# Patient Record
Sex: FEMALE | Race: WHITE | NOT HISPANIC OR LATINO | Employment: OTHER | URBAN - METROPOLITAN AREA
[De-identification: names, ages, dates, MRNs, and addresses within clinical notes are randomized per-mention and may not be internally consistent; named-entity substitution may affect disease eponyms.]

---

## 2023-03-21 RX ORDER — SIMVASTATIN 40 MG
TABLET ORAL
Qty: 90 TABLET | Refills: 0 | Status: SHIPPED | OUTPATIENT
Start: 2023-03-21

## 2023-04-03 RX ORDER — CYANOCOBALAMIN 1000 UG/ML
INJECTION, SOLUTION INTRAMUSCULAR; SUBCUTANEOUS
Qty: 3 ML | Refills: 1 | Status: SHIPPED | OUTPATIENT
Start: 2023-04-03

## 2023-04-03 NOTE — TELEPHONE ENCOUNTER
Rx Refill Note  Requested Prescriptions     Pending Prescriptions Disp Refills   • cyanocobalamin 1000 MCG/ML injection [Pharmacy Med Name: CYANOCOBALAMIN 1000MCG/ML INJ, 1ML] 3 mL      Sig: INJECT 1ML AS DIRECTED EVERY MONTH      Last office visit with prescribing clinician: 09/20/2022    Next office visit with prescribing clinician: 4/21/2023   Last Labs Done: 09/20/2022 (Information found in [facility])    Cameron Matias CMA  04/03/23, 12:02 EDT

## 2023-04-21 ENCOUNTER — OFFICE VISIT (OUTPATIENT)
Dept: FAMILY MEDICINE CLINIC | Facility: CLINIC | Age: 83
End: 2023-04-21
Payer: MEDICARE

## 2023-04-21 VITALS
BODY MASS INDEX: 26.57 KG/M2 | TEMPERATURE: 97.8 F | RESPIRATION RATE: 16 BRPM | WEIGHT: 159.5 LBS | HEART RATE: 71 BPM | HEIGHT: 65 IN | SYSTOLIC BLOOD PRESSURE: 131 MMHG | OXYGEN SATURATION: 98 % | DIASTOLIC BLOOD PRESSURE: 79 MMHG

## 2023-04-21 DIAGNOSIS — I48.0 PAROXYSMAL ATRIAL FIBRILLATION: ICD-10-CM

## 2023-04-21 DIAGNOSIS — M51.36 LUMBAR DEGENERATIVE DISC DISEASE: ICD-10-CM

## 2023-04-21 DIAGNOSIS — Z78.0 POSTMENOPAUSAL: ICD-10-CM

## 2023-04-21 DIAGNOSIS — E78.2 MIXED HYPERLIPIDEMIA: Primary | ICD-10-CM

## 2023-04-21 DIAGNOSIS — D59.8 OTHER ACQUIRED HEMOLYTIC ANEMIAS: ICD-10-CM

## 2023-04-21 PROBLEM — M51.369 LUMBAR DEGENERATIVE DISC DISEASE: Status: ACTIVE | Noted: 2023-04-21

## 2023-04-21 NOTE — PATIENT INSTRUCTIONS
Continue your current medications.   You had lab tests today. You should receive a call or my chart message with your test results. If you have not received your results in the next 7-10 days, please contact the office.    Agree with PT and follow up with pain management as scheduled.  Do follow up CBC and retic count through your hematologist as recommended.

## 2023-04-21 NOTE — PROGRESS NOTES
"Chief Complaint  Hyperlipidemia, Allergies, and Back Pain    Subjective    History of Present Illness {CC  Problem List  Visit  Diagnosis   Encounters  Notes  Medications  Labs  Result Review Imaging  Media :23}     Eryn Alva presents to [facility] for Hyperlipidemia, Allergies, and Back Pain.  History of Present Illness   She presents for follow-up of hyperlipidemia.  She is currently on simvastatin 40 mg daily.  She reports good compliance and tolerability of the medication.  Her last cholesterol was 209 with LDL of 106.  She is fasting for repeat today.    She is also here for follow-up of atrial fibrillation.  She is stable on metoprolol 50 mg twice daily.  She is also on Xarelto for stroke prevention.  She denies any recent palpitations.  She continues to see the cardiologist routinely.    She is also here for follow-up of autoimmune hemolytic anemia.  She is not currently on any medications.  She recently had follow-up with her hematologist and she was stable off medication.  Her CBC and reticulocyte site count will be monitored every 3 months through the hematologist and she will continue to see them on a yearly basis.    She is also here for follow-up of low back pain.  She is currently seeing pain management for her degenerative disc disease.  They have discussed the possibility of a stimulator to help with pain but she has not decided if she wants to try this or not.    Objective     Vital Signs:   /79   Pulse 71   Temp 97.8 °F (36.6 °C) (Oral)   Resp 16   Ht 165.1 cm (65\")   Wt 72.3 kg (159 lb 8 oz)   SpO2 98%   BMI 26.54 kg/m²   Body mass index is 26.54 kg/m².     Physical Exam  Constitutional:       General: She is not in acute distress.  Cardiovascular:      Rate and Rhythm: Normal rate and regular rhythm.      Heart sounds: No murmur heard.  Pulmonary:      Effort: No respiratory distress.      Breath sounds: Normal breath sounds. "   Neurological:      General: No focal deficit present.      Mental Status: She is alert.   Psychiatric:         Mood and Affect: Mood normal.         Behavior: Behavior normal.          Result Review  Data Reviewed:{ Labs  Result Review  Imaging  Med Tab  Media :23}                Assessment and Plan {CC Problem List  Visit Diagnosis  ROS  Review (Popup)  Health Maintenance  Quality  BestPractice  Medications  SmartSets  SnapShot Encounters  Media :23}   Diagnoses and all orders for this visit:    1. Mixed hyperlipidemia (Primary)  -     Lipid Panel    2. Paroxysmal atrial fibrillation    3. Other acquired hemolytic anemias    4. Lumbar degenerative disc disease    5. Postmenopausal  -     DEXA Bone Density Axial; Future        Patient Instructions   Continue your current medications.   You had lab tests today. You should receive a call or my chart message with your test results. If you have not received your results in the next 7-10 days, please contact the office.    Agree with PT and follow up with pain management as scheduled.  Do follow up CBC and retic count through your hematologist as recommended.       Patient was given instructions and counseling regarding her condition or for health maintenance advice on the AVS.       Return in about 6 months (around 10/21/2023) for Annual, Medicare Wellness.    Anabelle Horne MD

## 2023-04-22 LAB
CHOLEST SERPL-MCNC: 194 MG/DL (ref 100–199)
HDLC SERPL-MCNC: 78 MG/DL
LDLC SERPL CALC-MCNC: 70 MG/DL (ref 0–99)
TRIGL SERPL-MCNC: 293 MG/DL (ref 0–149)
VLDLC SERPL CALC-MCNC: 46 MG/DL (ref 5–40)

## 2023-05-26 ENCOUNTER — HOSPITAL ENCOUNTER (OUTPATIENT)
Dept: BONE DENSITY | Facility: HOSPITAL | Age: 83
Discharge: HOME | End: 2023-05-26
Payer: MEDICARE

## 2023-05-26 DIAGNOSIS — Z78.0 POSTMENOPAUSAL: ICD-10-CM

## 2023-05-26 PROCEDURE — 77080 DXA BONE DENSITY AXIAL: CPT

## 2023-09-20 RX ORDER — CYANOCOBALAMIN 1000 UG/ML
INJECTION, SOLUTION INTRAMUSCULAR; SUBCUTANEOUS
Qty: 3 ML | Refills: 1 | Status: SHIPPED | OUTPATIENT
Start: 2023-09-20

## 2023-09-20 NOTE — TELEPHONE ENCOUNTER
Rx Refill Note  Requested Prescriptions     Pending Prescriptions Disp Refills    cyanocobalamin 1000 MCG/ML injection [Pharmacy Med Name: CYANOCOBALAMIN 1000MCG/ML INJ 1ML] 3 mL 1     Sig: INJECT 1 ML AS DIRECTED EVERY MONTH      Last office visit with prescribing clinician: 4/21/2023   Last telemedicine visit with prescribing clinician: Visit date not found   Next office visit with prescribing clinician: 10/25/2023       Faye Rowley  09/20/23, 09:08 EDT

## 2023-10-16 ENCOUNTER — TELEPHONE (OUTPATIENT)
Dept: FAMILY MEDICINE CLINIC | Facility: CLINIC | Age: 83
End: 2023-10-16
Payer: COMMERCIAL

## 2023-10-16 DIAGNOSIS — Z12.31 SCREENING MAMMOGRAM, ENCOUNTER FOR: Primary | ICD-10-CM

## 2023-10-24 ENCOUNTER — TELEPHONE (OUTPATIENT)
Dept: FAMILY MEDICINE CLINIC | Facility: CLINIC | Age: 83
End: 2023-10-24
Payer: COMMERCIAL

## 2023-10-24 NOTE — TELEPHONE ENCOUNTER
Caller: FRANCIS MARIE    Relationship: Emergency Contact    Best call back number: [phone]     What orders are you requesting (i.e. lab or imaging): LABS    In what timeframe would the patient need to come in: PRIOR TO [facility]     Where will you receive your lab/imaging services: OFFICE    Additional notes: PLEASE CALL PATIENT TO SCHEDULE WHEN ORDERS ARE PLACED.         Wound Care Update:    Your provider has made some changes to your wound care plan as follows: Gentamycin ointment to the wound bases, and moisturizer to the dry skin of the remainder of the lower legs followed by Therabond and an absorbant layer (gauze).  After the treatments are applied, your legs will be wrapped in CoFlex 2-layer wraps.      You will continue to have your dressing changes performed by nurses from [facility] 2 times/week.      Protein    Dietary protein intake is an important component to promote wound healing.  Increase daily protein intake by eating more protein-rich foods such as lean meats, beans/legumes, peanut butter, and milk products including milk, cheese, and yogurt.  If you are unable to get enough protein intake through your diet, incorporate a protein supplement 1-2 times daily, such as Boost, Glucerna, or Ensure.    Elevate    Elevating your legs, above hip level or as high as you can tolerate it, as often as possible while you are awake.  This helps to promote circulation and decrease swelling in the lower legs.

## 2023-10-25 NOTE — TELEPHONE ENCOUNTER
I'm not seeing those labs in chart yet. I suspect she will still need some labs, cholesterol and possibly thyroid checked. If she wants to wait and do at time of visit we can.

## 2023-10-25 NOTE — TELEPHONE ENCOUNTER
Spoke with pt, she stated her cardoza  valentina blood yesterday and her biliruben was high and bp was low. Has now started infusions for this. Was unsure if you needed more labs or if you could just use those.

## 2023-10-26 NOTE — TELEPHONE ENCOUNTER
Pt aware, she said she's a hard stick and we usually have to send her out whenever she comes here. Can we send an order to [facility] for the Lipid and TSH so when they draw her labs next they can do it all at once?

## 2023-10-26 NOTE — TELEPHONE ENCOUNTER
Pt said she got the labs done at [facility]. L/m with the nurse line to have them fax over lab results. Their voicemail said medical record requests can take up to 48 hours.

## 2023-10-30 DIAGNOSIS — I48.0 PAROXYSMAL ATRIAL FIBRILLATION: ICD-10-CM

## 2023-10-30 DIAGNOSIS — E78.2 MIXED HYPERLIPIDEMIA: Primary | ICD-10-CM

## 2023-10-30 NOTE — TELEPHONE ENCOUNTER
I'm not able to send to [facility] but I did print out the order that we might be able to fax to [facility], mail to her or have her /

## 2023-12-18 RX ORDER — SIMVASTATIN 40 MG
TABLET ORAL
Qty: 90 TABLET | Refills: 1 | Status: SHIPPED | OUTPATIENT
Start: 2023-12-18

## 2023-12-18 NOTE — TELEPHONE ENCOUNTER
Rx Refill Note  Requested Prescriptions     Pending Prescriptions Disp Refills    simvastatin (ZOCOR) 40 MG tablet [Pharmacy Med Name: SIMVASTATIN  TAB 40MG] 90 tablet 1     Sig: TAKE 1 TABLET DAILY      Last office visit with prescribing clinician: 4/21/2023   Next office visit with prescribing clinician: 2/14/2024     Grace Allan MA  12/18/23, 15:30 EST

## 2024-02-14 ENCOUNTER — OFFICE VISIT (OUTPATIENT)
Dept: FAMILY MEDICINE CLINIC | Facility: CLINIC | Age: 84
End: 2024-02-14
Payer: MEDICARE

## 2024-02-14 VITALS
OXYGEN SATURATION: 99 % | DIASTOLIC BLOOD PRESSURE: 77 MMHG | HEIGHT: 65 IN | BODY MASS INDEX: 26.16 KG/M2 | SYSTOLIC BLOOD PRESSURE: 129 MMHG | WEIGHT: 157 LBS | HEART RATE: 75 BPM

## 2024-02-14 DIAGNOSIS — E53.8 B12 DEFICIENCY: ICD-10-CM

## 2024-02-14 DIAGNOSIS — E78.2 MIXED HYPERLIPIDEMIA: ICD-10-CM

## 2024-02-14 DIAGNOSIS — Z00.00 ENCOUNTER FOR SUBSEQUENT ANNUAL WELLNESS VISIT (AWV) IN MEDICARE PATIENT: Primary | ICD-10-CM

## 2024-02-14 DIAGNOSIS — D59.8 OTHER ACQUIRED HEMOLYTIC ANEMIAS: ICD-10-CM

## 2024-02-14 DIAGNOSIS — I48.0 PAROXYSMAL ATRIAL FIBRILLATION: ICD-10-CM

## 2024-02-14 DIAGNOSIS — M51.36 LUMBAR DEGENERATIVE DISC DISEASE: ICD-10-CM

## 2024-02-14 RX ORDER — PREDNISONE 20 MG/1
20 TABLET ORAL DAILY
COMMUNITY

## 2024-02-14 RX ORDER — DIPHENOXYLATE HYDROCHLORIDE AND ATROPINE SULFATE 2.5; .025 MG/1; MG/1
TABLET ORAL DAILY
COMMUNITY

## 2024-02-14 RX ORDER — LEVOCETIRIZINE DIHYDROCHLORIDE 5 MG/1
5 TABLET, FILM COATED ORAL DAILY
COMMUNITY

## 2024-02-15 LAB
CHOLEST SERPL-MCNC: 221 MG/DL (ref 0–200)
HDLC SERPL-MCNC: 90 MG/DL (ref 40–60)
LDLC SERPL CALC-MCNC: 86 MG/DL (ref 0–100)
TRIGL SERPL-MCNC: 280 MG/DL (ref 0–150)
VIT B12 SERPL-MCNC: 811 PG/ML (ref 211–946)
VLDLC SERPL CALC-MCNC: 45 MG/DL (ref 5–40)

## 2024-04-04 RX ORDER — CYANOCOBALAMIN 1000 UG/ML
INJECTION, SOLUTION INTRAMUSCULAR; SUBCUTANEOUS
Qty: 3 ML | Refills: 1 | Status: SHIPPED | OUTPATIENT
Start: 2024-04-04

## 2024-04-04 NOTE — TELEPHONE ENCOUNTER
Rx Refill Note  Requested Prescriptions     Pending Prescriptions Disp Refills    cyanocobalamin 1000 MCG/ML injection [Pharmacy Med Name: CYANOCOBALAMIN 1000MCG/MLINJ 1ML] 3 mL 1     Sig: INJECT 1 ML AS DIRECTED ONCE A MONTH      Last office visit with prescribing clinician: 2/14/2024   Last telemedicine visit with prescribing clinician: Visit date not found   Next office visit with prescribing clinician: Visit date not found     Grace Allan MA  04/04/24, 13:33 EDT

## 2024-06-24 RX ORDER — SIMVASTATIN 40 MG
TABLET ORAL
Qty: 90 TABLET | Refills: 0 | Status: SHIPPED | OUTPATIENT
Start: 2024-06-24

## 2024-06-24 NOTE — TELEPHONE ENCOUNTER
Rx Refill Note  Requested Prescriptions     Pending Prescriptions Disp Refills    simvastatin (ZOCOR) 40 MG tablet [Pharmacy Med Name: SIMVASTATIN  TAB 40MG] 90 tablet 1     Sig: TAKE 1 TABLET DAILY      Last office visit with prescribing clinician: Visit date not found   Last telemedicine visit with prescribing clinician: Visit date not found   Next office visit with prescribing clinician: Visit date not found       Robel Samayoa MA  06/24/24, 08:12 EDT

## 2024-09-19 RX ORDER — SIMVASTATIN 40 MG
TABLET ORAL
Qty: 90 TABLET | Refills: 0 | Status: SHIPPED | OUTPATIENT
Start: 2024-09-19

## 2024-09-25 RX ORDER — CYANOCOBALAMIN 1000 UG/ML
INJECTION, SOLUTION INTRAMUSCULAR; SUBCUTANEOUS
Qty: 3 ML | Refills: 1 | Status: SHIPPED | OUTPATIENT
Start: 2024-09-25

## 2024-10-02 ENCOUNTER — OFFICE VISIT (OUTPATIENT)
Dept: FAMILY MEDICINE CLINIC | Facility: CLINIC | Age: 84
End: 2024-10-02
Payer: MEDICARE

## 2024-10-02 VITALS
HEART RATE: 77 BPM | WEIGHT: 157.1 LBS | SYSTOLIC BLOOD PRESSURE: 134 MMHG | OXYGEN SATURATION: 97 % | HEIGHT: 65 IN | DIASTOLIC BLOOD PRESSURE: 70 MMHG | BODY MASS INDEX: 26.17 KG/M2

## 2024-10-02 DIAGNOSIS — M51.360 DEGENERATION OF INTERVERTEBRAL DISC OF LUMBAR REGION WITH DISCOGENIC BACK PAIN: ICD-10-CM

## 2024-10-02 DIAGNOSIS — I48.0 PAROXYSMAL ATRIAL FIBRILLATION: ICD-10-CM

## 2024-10-02 DIAGNOSIS — E78.2 MIXED HYPERLIPIDEMIA: Primary | ICD-10-CM

## 2024-10-02 DIAGNOSIS — Z79.01 CURRENT USE OF LONG TERM ANTICOAGULATION: ICD-10-CM

## 2024-10-02 DIAGNOSIS — D59.8 OTHER ACQUIRED HEMOLYTIC ANEMIAS: ICD-10-CM

## 2024-10-02 PROCEDURE — G2211 COMPLEX E/M VISIT ADD ON: HCPCS | Performed by: FAMILY MEDICINE

## 2024-10-02 PROCEDURE — 1159F MED LIST DOCD IN RCRD: CPT | Performed by: FAMILY MEDICINE

## 2024-10-02 PROCEDURE — 99214 OFFICE O/P EST MOD 30 MIN: CPT | Performed by: FAMILY MEDICINE

## 2024-10-02 PROCEDURE — 1160F RVW MEDS BY RX/DR IN RCRD: CPT | Performed by: FAMILY MEDICINE

## 2024-10-02 NOTE — PATIENT INSTRUCTIONS
Continue your current medications, healthy diet and exercise as tolerated.  You had lab tests today. You should receive a call or my chart message with your test results. If you have not received your results in the next 7-10 days, please contact the office.    Follow up with hematologist as scheduled.  If back pain worsens and you would like to pursue further treatment options, let us know.

## 2024-10-02 NOTE — PROGRESS NOTES
"Chief Complaint  Hyperlipidemia    Subjective    History of Present Illness {  Problem List  Visit  Diagnosis   Encounters  Notes  Medications  Labs  Result Review Imaging  Media :23}     Eryn Alva presents to [facility] for Hyperlipidemia.  Hyperlipidemia       She presents for follow up of hyperlipidemia. She is currently on simvastatin 40mg daily. She reports good compliance and tolerability of medication. Her last cholesterol was 221 with LDL of 86, HDL 90 and triglycerides of 280. She is due recheck today.    She has a history of autoimmune hemolytic anemia. She sees hematologist, Dr. Silva is currently on prednisone. She has reduced gradually to 2mg every other day. She is also on rituxan infusion about every 4 months. She has labs routinely but hasn't had in over 2 months.     She also has a history of paroxysmal atrial fibrillation and is on metoprolol and xarelto. She will have intermittent episodes of palpitations for 1-2 minutes. She sees the cardiologist yearly.    She has a history of ulcerative colitis and has had previous colectomy. She had not had recent flare.    She complains of ongoing low back pain. She has a history of lumbar degenerative disc disease. She had surgery years ago and has recently done physical therapy. She has also seen pain management. They discussed procedure but she declined as her current pain level is manageable.        Objective     Vital Signs:   /70   Pulse 77   Ht 165.1 cm (65\")   Wt 71.3 kg (157 lb 1.6 oz)   SpO2 97%   BMI 26.14 kg/m²   Body mass index is 26.14 kg/m².     Physical Exam  Constitutional:       General: She is not in acute distress.  Cardiovascular:      Rate and Rhythm: Normal rate and regular rhythm.      Heart sounds: No murmur heard.  Pulmonary:      Effort: No respiratory distress.      Breath sounds: Normal breath sounds.   Neurological:      General: No focal deficit present.      " Mental Status: She is alert.   Psychiatric:         Behavior: Behavior normal.          Result Review  Data Reviewed:{ Labs  Result Review  Imaging  Med Tab  Media :23}                Assessment and Plan {CC Problem List  Visit Diagnosis  ROS  Review (Popup)  Health Maintenance  Quality  BestPractice  Medications  SmartSets  SnapShot Encounters  Media :23}   Diagnoses and all orders for this visit:    1. Mixed hyperlipidemia (Primary)  -     Comprehensive Metabolic Panel  -     Lipid Panel    2. Paroxysmal atrial fibrillation    3. Other acquired hemolytic anemias  -     Reticulocytes  -     CBC & Differential  -     Comprehensive Metabolic Panel  -     Lactate Dehydrogenase    4. Degeneration of intervertebral disc of lumbar region with discogenic back pain    5. Current use of long term anticoagulation  -     CBC & Differential        Patient Instructions   Continue your current medications, healthy diet and exercise as tolerated.  You had lab tests today. You should receive a call or my chart message with your test results. If you have not received your results in the next 7-10 days, please contact the office.    Follow up with hematologist as scheduled.  If back pain worsens and you would like to pursue further treatment options, let us know.      Patient was given instructions and counseling regarding her condition or for health maintenance advice on the AVS.       Return in about 6 months (around 4/2/2025) for Medicare Wellness.    Anabelle Horne MD

## 2024-10-07 LAB
ALBUMIN SERPL-MCNC: 4.5 G/DL (ref 3.5–5.2)
ALBUMIN/GLOB SERPL: 2.6 G/DL
ALP SERPL-CCNC: 75 U/L (ref 39–117)
ALT SERPL-CCNC: 16 U/L (ref 1–33)
AST SERPL-CCNC: 32 U/L (ref 1–32)
BASOPHILS # BLD MANUAL: 0.16 10*3/MM3 (ref 0–0.2)
BASOPHILS NFR BLD MANUAL: 4 % (ref 0–1.5)
BILIRUB SERPL-MCNC: 4.7 MG/DL (ref 0–1.2)
BUN SERPL-MCNC: 21 MG/DL (ref 8–23)
BUN/CREAT SERPL: 21 (ref 7–25)
CALCIUM SERPL-MCNC: 9.8 MG/DL (ref 8.6–10.5)
CHLORIDE SERPL-SCNC: 105 MMOL/L (ref 98–107)
CHOLEST SERPL-MCNC: 163 MG/DL (ref 0–200)
CO2 SERPL-SCNC: 23.3 MMOL/L (ref 22–29)
CREAT SERPL-MCNC: 1 MG/DL (ref 0.57–1)
DIFFERENTIAL COMMENT: ABNORMAL
EGFRCR SERPLBLD CKD-EPI 2021: 55.7 ML/MIN/1.73
EOSINOPHIL # BLD MANUAL: 0 10*3/MM3 (ref 0–0.4)
EOSINOPHIL NFR BLD MANUAL: 0 % (ref 0.3–6.2)
ERYTHROCYTE [DISTWIDTH] IN BLOOD BY AUTOMATED COUNT: 11.6 % (ref 12.3–15.4)
GLOBULIN SER CALC-MCNC: 1.7 GM/DL
GLUCOSE SERPL-MCNC: 98 MG/DL (ref 65–99)
HCT VFR BLD AUTO: 35.3 % (ref 34–46.6)
HDLC SERPL-MCNC: 57 MG/DL (ref 40–60)
HGB BLD-MCNC: 11.9 G/DL (ref 12–15.9)
LDH SERPL L TO P-CCNC: 387 U/L (ref 135–214)
LDLC SERPL CALC-MCNC: 64 MG/DL (ref 0–100)
LYMPHOCYTES # BLD MANUAL: 0.85 10*3/MM3 (ref 0.7–3.1)
LYMPHOCYTES NFR BLD MANUAL: 21 % (ref 19.6–45.3)
MCH RBC QN AUTO: 34.4 PG (ref 26.6–33)
MCHC RBC AUTO-ENTMCNC: 33.7 G/DL (ref 31.5–35.7)
MCV RBC AUTO: 102 FL (ref 79–97)
MONOCYTES # BLD MANUAL: 0.73 10*3/MM3 (ref 0.1–0.9)
MONOCYTES NFR BLD MANUAL: 18 % (ref 5–12)
NEUTROPHILS # BLD MANUAL: 2.31 10*3/MM3 (ref 1.7–7)
NEUTROPHILS NFR BLD MANUAL: 57 % (ref 42.7–76)
NRBC BLD AUTO-RTO: 0 /100 WBC (ref 0–0.2)
PLATELET # BLD AUTO: 212 10*3/MM3 (ref 140–450)
PLATELET BLD QL SMEAR: ABNORMAL
POTASSIUM SERPL-SCNC: 4.4 MMOL/L (ref 3.5–5.2)
PROT SERPL-MCNC: 6.2 G/DL (ref 6–8.5)
RBC # BLD AUTO: 3.46 10*6/MM3 (ref 3.77–5.28)
RBC MORPH BLD: ABNORMAL
RETICS/RBC NFR AUTO: 3.81 % (ref 0.7–1.9)
SODIUM SERPL-SCNC: 143 MMOL/L (ref 136–145)
TRIGL SERPL-MCNC: 268 MG/DL (ref 0–150)
VLDLC SERPL CALC-MCNC: 42 MG/DL (ref 5–40)
WBC # BLD AUTO: 4.06 10*3/MM3 (ref 3.4–10.8)

## 2024-11-08 ENCOUNTER — TELEPHONE (OUTPATIENT)
Dept: FAMILY MEDICINE CLINIC | Facility: CLINIC | Age: 84
End: 2024-11-08
Payer: MEDICARE

## 2024-11-08 NOTE — TELEPHONE ENCOUNTER
[facility] SCHEDULING SENT A FAX OVER FOR PT TO HAVE A MAMMOGRAM HEIDY SCREENING BILATERAL SCHEDULED AT [facility] TUESDAY 11/12/24 AND NEEDS AN ORDER FAXED [phone].

## 2024-11-11 ENCOUNTER — OFFICE VISIT (OUTPATIENT)
Dept: FAMILY MEDICINE CLINIC | Facility: CLINIC | Age: 84
End: 2024-11-11
Payer: MEDICARE

## 2024-11-11 ENCOUNTER — TELEPHONE (OUTPATIENT)
Dept: FAMILY MEDICINE CLINIC | Facility: CLINIC | Age: 84
End: 2024-11-11

## 2024-11-11 VITALS
WEIGHT: 154.6 LBS | BODY MASS INDEX: 25.76 KG/M2 | SYSTOLIC BLOOD PRESSURE: 132 MMHG | DIASTOLIC BLOOD PRESSURE: 64 MMHG | OXYGEN SATURATION: 97 % | HEIGHT: 65 IN | HEART RATE: 68 BPM

## 2024-11-11 DIAGNOSIS — Z12.31 SCREENING MAMMOGRAM, ENCOUNTER FOR: Primary | ICD-10-CM

## 2024-11-11 DIAGNOSIS — D59.8 OTHER ACQUIRED HEMOLYTIC ANEMIAS: ICD-10-CM

## 2024-11-11 DIAGNOSIS — I48.0 PAROXYSMAL ATRIAL FIBRILLATION: ICD-10-CM

## 2024-11-11 DIAGNOSIS — R10.30 LOWER ABDOMINAL PAIN: Primary | ICD-10-CM

## 2024-11-11 LAB
BILIRUB BLD-MCNC: NEGATIVE MG/DL
CLARITY, POC: ABNORMAL
COLOR UR: ABNORMAL
GLUCOSE UR STRIP-MCNC: NEGATIVE MG/DL
KETONES UR QL: NEGATIVE
LEUKOCYTE EST, POC: NEGATIVE
NITRITE UR-MCNC: NEGATIVE MG/ML
PH UR: 5.5 [PH] (ref 5–8)
PROT UR STRIP-MCNC: ABNORMAL MG/DL
RBC # UR STRIP: NEGATIVE /UL
SP GR UR: 1.03 (ref 1–1.03)
UROBILINOGEN UR QL: ABNORMAL

## 2024-11-11 PROCEDURE — 81002 URINALYSIS NONAUTO W/O SCOPE: CPT | Performed by: FAMILY MEDICINE

## 2024-11-11 PROCEDURE — 99213 OFFICE O/P EST LOW 20 MIN: CPT | Performed by: FAMILY MEDICINE

## 2024-11-11 RX ORDER — NITROFURANTOIN 25; 75 MG/1; MG/1
100 CAPSULE ORAL 2 TIMES DAILY
Qty: 14 CAPSULE | Refills: 0 | Status: SHIPPED | OUTPATIENT
Start: 2024-11-11 | End: 2024-11-18

## 2024-11-11 NOTE — PATIENT INSTRUCTIONS
Increase water and take antibiotic as directed.  If increasing pain or new symptoms despite treatment let us know or if symptoms are severe, go to the ER.

## 2024-11-11 NOTE — PROGRESS NOTES
"Chief Complaint  Urinary Tract Infection    Subjective    History of Present Illness {CC  Problem List  Visit  Diagnosis   Encounters  Notes  Medications  Labs  Result Review Imaging  Media :23}     Eryn Alva presents to [facility] for Urinary Tract Infection.  Urinary Tract Infection          She presents with concern of possible UTI. She was seen in the ER 1.5 weeks ago with flare of atrial fibrillation and was found to have a UTI. She was treated with keflex and improved but the past couple of days she has been having increased nausea, mild abdominal tenderness and frequency. She denies any fever or dysuria.     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 She sees Dr. Mercado) and is being started on a new medication (cellcept) but has not yet started this.    Objective     Vital Signs:   /64   Pulse 68   Ht 165.1 cm (65\")   Wt 70.1 kg (154 lb 9.6 oz)   SpO2 97%   BMI 25.73 kg/m²   Body mass index is 25.73 kg/m².     Physical Exam  Constitutional:       General: She is not in acute distress.  Pulmonary:      Effort: No respiratory distress.   Abdominal:      General: There is no distension.      Palpations: Abdomen is soft.      Tenderness: There is abdominal tenderness (mild left).   Skin:     Coloration: Skin is jaundiced (mild).   Psychiatric:         Behavior: Behavior normal.          Result Review  Data Reviewed:{ Labs  Result Review  Imaging  Med Tab  Media :23}                Assessment and Plan {CC Problem List  Visit Diagnosis  ROS  Review (Popup)  Health Maintenance  Quality  BestPractice  Medications  SmartSets  SnapShot Encounters  Media :23}   Diagnoses and all orders for this visit:    1. Lower abdominal pain (Primary)  -     POC Urinalysis Dipstick  -     Urine Culture - Urine, Urine, Clean Catch    2. Other acquired hemolytic anemias    3. Paroxysmal atrial fibrillation    Other " orders  -     nitrofurantoin, macrocrystal-monohydrate, (Macrobid) 100 MG capsule; Take 1 capsule by mouth 2 (Two) Times a Day for 7 days.  Dispense: 14 capsule; Refill: 0        Patient Instructions   Increase water and take antibiotic as directed.  If increasing pain or new symptoms despite treatment let us know or if symptoms are severe, go to the ER.      Patient was given instructions and counseling regarding her condition or for health maintenance advice on the AVS.       No follow-ups on file.    Anabelle Horne MD

## 2024-11-13 LAB
BACTERIA UR CULT: NORMAL
BACTERIA UR CULT: NORMAL

## 2024-12-17 RX ORDER — SIMVASTATIN 40 MG
TABLET ORAL
Qty: 90 TABLET | Refills: 0 | Status: SHIPPED | OUTPATIENT
Start: 2024-12-17

## 2025-03-04 RX ORDER — CYANOCOBALAMIN 1000 UG/ML
INJECTION, SOLUTION INTRAMUSCULAR; SUBCUTANEOUS
Qty: 3 ML | Refills: 1 | Status: SHIPPED | OUTPATIENT
Start: 2025-03-04

## 2025-03-04 NOTE — TELEPHONE ENCOUNTER
Rx Refill Note  Requested Prescriptions     Pending Prescriptions Disp Refills    cyanocobalamin 1000 MCG/ML injection [Pharmacy Med Name: CYANOCOBALAMIN 1000MCG/ML INJ, 1ML] 3 mL 1     Sig: INJECT 1 ML AS DIRECTED ONCE A MONTH      Last office visit with prescribing clinician: 11/11/2024   Next office visit with prescribing clinician: 4/16/2025     Robel Samayoa MA  03/04/25, 11:28 EST

## 2025-03-14 RX ORDER — SIMVASTATIN 40 MG
40 TABLET ORAL DAILY
Qty: 90 TABLET | Refills: 0 | Status: SHIPPED | OUTPATIENT
Start: 2025-03-14

## 2025-04-16 ENCOUNTER — OFFICE VISIT (OUTPATIENT)
Dept: FAMILY MEDICINE CLINIC | Facility: CLINIC | Age: 85
End: 2025-04-16
Payer: MEDICARE

## 2025-04-16 VITALS
OXYGEN SATURATION: 100 % | HEART RATE: 67 BPM | HEIGHT: 65 IN | WEIGHT: 151 LBS | DIASTOLIC BLOOD PRESSURE: 73 MMHG | BODY MASS INDEX: 25.16 KG/M2 | SYSTOLIC BLOOD PRESSURE: 151 MMHG

## 2025-04-16 DIAGNOSIS — I48.0 PAROXYSMAL ATRIAL FIBRILLATION: ICD-10-CM

## 2025-04-16 DIAGNOSIS — Z79.01 CURRENT USE OF LONG TERM ANTICOAGULATION: ICD-10-CM

## 2025-04-16 DIAGNOSIS — Z00.00 ENCOUNTER FOR SUBSEQUENT ANNUAL WELLNESS VISIT (AWV) IN MEDICARE PATIENT: Primary | ICD-10-CM

## 2025-04-16 DIAGNOSIS — D59.8 OTHER ACQUIRED HEMOLYTIC ANEMIAS: ICD-10-CM

## 2025-04-16 DIAGNOSIS — E53.8 B12 DEFICIENCY: ICD-10-CM

## 2025-04-16 DIAGNOSIS — E78.2 MIXED HYPERLIPIDEMIA: ICD-10-CM

## 2025-04-16 DIAGNOSIS — R03.0 ELEVATED BLOOD PRESSURE READING: ICD-10-CM

## 2025-04-16 RX ORDER — PREDNISONE 1 MG/1
TABLET ORAL
COMMUNITY

## 2025-04-16 RX ORDER — MYCOPHENOLATE MOFETIL 500 MG/1
500 TABLET ORAL
COMMUNITY
Start: 2024-12-13

## 2025-04-16 NOTE — PROGRESS NOTES
Subjective   The ABCs of the Annual Wellness Visit  Medicare Wellness Visit      Eryn Alva is a 85 y.o. patient who presents for a Medicare Wellness Visit.  She is  and has 4 children that are local and available for assistance. She has a history of ulcerative colitis and has had total colectomy (age 43). She no longer sees GI on regular basis. She was never a smoker.     The following portions of the patient's history were reviewed and   updated as appropriate: allergies, current medications, past family history, past medical history, past social history, past surgical history, and problem list.    Compared to one year ago, the patient's physical   health is the same.  Compared to one year ago, the patient's mental   health is the same.    Recent Hospitalizations:  She was not admitted to the hospital during the last year.     Current Medical Providers:  Patient Care Team:  Anabelle Horne MD as PCP - General  Wicho Hudson MD as Consulting Physician (Hematology and Oncology)  Jovanny Zelaya MD as Consulting Physician (Cardiology)  LANCE Chapa MD as Consulting Physician (Ophthalmology)  Dermatology    Outpatient Medications Prior to Visit   Medication Sig Dispense Refill    Calcium Carbonate-Vit D-Min (Calcium 600 + Minerals) 600-200 MG-UNIT tablet Take 1 tablet by mouth Daily.      CALCIUM PO Take 1 tablet by mouth Daily.      cyanocobalamin 1000 MCG/ML injection INJECT 1 ML AS DIRECTED ONCE A MONTH 3 mL 1    levocetirizine (XYZAL) 5 MG tablet Take 1 tablet by mouth Daily.      metoprolol tartrate (LOPRESSOR) 50 MG tablet Take 1 tablet by mouth 2 (Two) Times a Day.      multivitamin (MULTIPLE VITAMINS ESSENTIAL PO) Take  by mouth Daily.      mycophenolate (CELLCEPT) 500 MG tablet Take 1 tablet by mouth.      predniSONE (DELTASONE) 1 MG tablet TAKE 3 TABLETS BY MOUTH EVERY OTHER DAY      rivaroxaban (XARELTO) 20 MG tablet Take 1 tablet by mouth Daily.       "simvastatin (ZOCOR) 40 MG tablet TAKE 1 TABLET DAILY 90 tablet 0    predniSONE (DELTASONE) 20 MG tablet Take 1 tablet by mouth Daily.       No facility-administered medications prior to visit.     No opioid medication identified on active medication list. I have reviewed chart for other potential  high risk medication/s and harmful drug interactions in the elderly.      Aspirin is not on active medication list.  Aspirin use is contraindicated for this patient due to: current use of Xarelto.  .    Patient Active Problem List   Diagnosis    Mixed hyperlipidemia    Ulcerative colitis    Paroxysmal atrial fibrillation    Other acquired hemolytic anemias    Lumbar degenerative disc disease     Advance Care Planning Advance Directive is on file.  ACP discussion was held with the patient during this visit. Patient has an advance directive in EMR which is still valid.             Objective   Vitals:    04/16/25 0852   BP: 151/73   Pulse: 67   SpO2: 100%   Weight: 68.5 kg (151 lb)   Height: 165.1 cm (65\")   PainSc: 0-No pain       Estimated body mass index is 25.13 kg/m² as calculated from the following:    Height as of this encounter: 165.1 cm (65\").    Weight as of this encounter: 68.5 kg (151 lb).                Does the patient have evidence of cognitive impairment? No                                                                                                Health  Risk Assessment    Smoking Status:  Social History     Tobacco Use   Smoking Status Never   Smokeless Tobacco Never     Alcohol Consumption:  Social History     Substance and Sexual Activity   Alcohol Use Not Currently    Comment: OCC       Fall Risk Screen  STEADI Fall Risk Assessment was completed, and patient is at LOW risk for falls.Assessment completed on:4/16/2025    Depression Screening   Little interest or pleasure in doing things? Not at all   Feeling down, depressed, or hopeless? Not at all   PHQ-2 Total Score 0      Health Habits and Functional " and Cognitive Screenin/12/2025     7:53 PM   Functional & Cognitive Status   Do you have difficulty preparing food and eating? No   Do you have difficulty bathing yourself, getting dressed or grooming yourself? No   Do you have difficulty using the toilet? No   Do you have difficulty moving around from place to place? No   Do you have trouble with steps or getting out of a bed or a chair? No   Current Diet Well Balanced Diet   Dental Exam Up to date   Eye Exam Up to date   Exercise (times per week) 0 times per week   Current Exercises Include No Regular Exercise   Do you need help using the phone?  No   Are you deaf or do you have serious difficulty hearing?  No   Do you need help to go to places out of walking distance? No   Do you need help shopping? No   Do you need help preparing meals?  No   Do you need help with housework?  No   Do you need help with laundry? No   Do you need help taking your medications? No   Do you need help managing money? No   Do you ever drive or ride in a car without wearing a seat belt? No   Have you felt unusual stress, anger or loneliness in the last month? No   Who do you live with? Alone   If you need help, do you have trouble finding someone available to you? No   Have you been bothered in the last four weeks by sexual problems? No   Do you have difficulty concentrating, remembering or making decisions? No           Age-appropriate Screening Schedule:  Refer to the list below for future screening recommendations based on patient's age, sex and/or medical conditions. Orders for these recommended tests are listed in the plan section. The patient has been provided with a written plan.    Health Maintenance List  Health Maintenance   Topic Date Due    RSV Vaccine - Adults (1 - 1-dose 75+ series) Never done    COVID-19 Vaccine (2024- season) 2024    ANNUAL WELLNESS VISIT  2025    DXA SCAN  2025    INFLUENZA VACCINE  2025    LIPID PANEL  10/02/2025     TDAP/TD VACCINES (2 - Td or Tdap) 06/11/2033    Pneumococcal Vaccine 50+  Completed    ZOSTER VACCINE  Completed                                                                                                                                                CMS Preventative Services Quick Reference  Risk Factors Identified During Encounter  Hearing Problem:  She sees audiologist, Dr. Lara.    The above risks/problems have been discussed with the patient.  Pertinent information has been shared with the patient in the After Visit Summary.  An After Visit Summary and PPPS were made available to the patient.    Follow Up:   Next Medicare Wellness visit to be scheduled in 1 year.         Additional E&M Note during same encounter follows:  Patient has additional, significant, and separately identifiable condition(s)/problem(s) that require work above and beyond the Medicare Wellness Visit     Chief Complaint  Medicare Wellness-subsequent    Subjective   HPI  AMIE is also being seen today for additional medical problem/s.  She is also here for follow-up of hyperlipidemia.  She is currently on simvastatin 40 mg daily.  She reports good compliance and tolerability of the medic [place].  She is fasting for repeat labs today.    He also has a history of paroxysmal atrial fibrillation.  She has been well-controlled on metoprolol 50 mg twice daily.  She denies any recent chest pain or palpitations.  She is also on Xarelto for stroke prevention.    She also has a history of hemolytic anemia and is seeing the hematologist routinely for this.  She is currently on prednisone 2 mg every other day and CellCept.  Her blood pressure has been mildly elevated which may be related to the CellCept.  She hematologist has been monitoring this.    Review of Systems   Constitutional:  Positive for fatigue (chronic, fluctuates).   HENT:  Negative for ear pain and sore throat.    Eyes:  Negative for pain and visual disturbance (sees  "ophthalmologist regularly).   Respiratory:  Negative for cough and shortness of breath.    Cardiovascular:  Negative for chest pain and palpitations.   Gastrointestinal:  Positive for abdominal pain. Anal bleeding: intermittent.  Genitourinary:  Negative for dysuria.   Musculoskeletal:  Positive for back pain (chronic).   Skin:  Negative for color change (sees dermatologist).   Allergic/Immunologic: Negative for environmental allergies.   Neurological:  Negative for dizziness.   Psychiatric/Behavioral:  Negative for dysphoric mood and suicidal ideas. The patient is not nervous/anxious.               Objective   Vital Signs:  /73   Pulse 67   Ht 165.1 cm (65\")   Wt 68.5 kg (151 lb)   SpO2 100%   BMI 25.13 kg/m²   Physical Exam  Constitutional:       General: She is not in acute distress.  HENT:      Nose: No rhinorrhea.      Mouth/Throat:      Mouth: Mucous membranes are moist.   Cardiovascular:      Rate and Rhythm: Normal rate and regular rhythm.   Pulmonary:      Effort: No respiratory distress.   Abdominal:      Palpations: Abdomen is soft.      Tenderness: There is no abdominal tenderness.   Musculoskeletal:      Right lower leg: No edema.      Left lower leg: No edema.   Lymphadenopathy:      Cervical: No cervical adenopathy.   Skin:     General: Skin is warm.   Neurological:      General: No focal deficit present.   Psychiatric:         Behavior: Behavior normal.                    Assessment and Plan      Encounter for subsequent annual wellness visit (AWV) in Medicare patient  Continue your current medications, healthy diet and activity as tolerated.  You had lab tests today. You should receive a call or my chart message with your test results. If you have not received your results in the next 7-10 days, please contact the office.    Follow up with hematologist as scheduled for follow up of hemolytic anemia.       Mixed hyperlipidemia     Continue simvastatin.  Orders:    Lipid Panel    Paroxysmal " atrial fibrillation  Continue metoprolol and Xarelto.  Continue yearly follow-up with Dr. Zelaya.       Current use of long term anticoagulation         Other acquired hemolytic anemias  Continue prednisone and CellCept.  Continue routine labs and follow-up with hematologist.       B12 deficiency    Orders:    Vitamin B12    Elevated blood pressure reading  Continue to monitor and if staying consistently above 140/90, can add medication to lower blood pressure.               Follow Up   Return in about 6 months (around 10/16/2025) for Recheck.  Patient was given instructions and counseling regarding her condition or for health maintenance advice. Please see specific information pulled into the AVS if appropriate.    Anabelle Horne MD

## 2025-04-16 NOTE — PATIENT INSTRUCTIONS
Continue your current medications, healthy diet and activity as tolerated.  You had lab tests today. You should receive a call or my chart message with your test results. If you have not received your results in the next 7-10 days, please contact the office.    Follow up with hematologist as scheduled for follow up of hemolytic anemia.

## 2025-04-17 LAB
CHOLEST SERPL-MCNC: 179 MG/DL (ref 0–200)
HDLC SERPL-MCNC: 68 MG/DL (ref 40–60)
LDLC SERPL CALC-MCNC: 83 MG/DL (ref 0–100)
TRIGL SERPL-MCNC: 168 MG/DL (ref 0–150)
VIT B12 SERPL-MCNC: 687 PG/ML (ref 211–946)
VLDLC SERPL CALC-MCNC: 28 MG/DL (ref 5–40)

## 2025-06-23 RX ORDER — SIMVASTATIN 40 MG
40 TABLET ORAL DAILY
Qty: 90 TABLET | Refills: 0 | Status: SHIPPED | OUTPATIENT
Start: 2025-06-23

## 2025-06-23 NOTE — TELEPHONE ENCOUNTER
Caller: XIMENA CHUN    Relationship: Emergency Contact    Best call back number: [phone]    Requested Prescriptions:   Requested Prescriptions     Pending Prescriptions Disp Refills    simvastatin (ZOCOR) 40 MG tablet 90 tablet 0     Sig: Take 1 tablet by mouth Daily.      [facility] [place] [facility] [address] [facility] [address] [facility] - [phone]  - [phone] FX     Last office visit with prescribing clinician: 4/16/2025   Last telemedicine visit with prescribing clinician: Visit date not found   Next office visit with prescribing clinician: Visit date not found     Does the patient have less than a 3 day supply:  [x] Yes  [] No    Shaq Vergara Rep   06/23/25 13:58 EDT